# Patient Record
Sex: MALE | Race: WHITE
[De-identification: names, ages, dates, MRNs, and addresses within clinical notes are randomized per-mention and may not be internally consistent; named-entity substitution may affect disease eponyms.]

---

## 2019-03-07 ENCOUNTER — HOSPITAL ENCOUNTER (EMERGENCY)
Dept: HOSPITAL 31 - C.ER | Age: 39
Discharge: HOME | End: 2019-03-07
Payer: SELF-PAY

## 2019-03-07 VITALS — DIASTOLIC BLOOD PRESSURE: 88 MMHG | SYSTOLIC BLOOD PRESSURE: 132 MMHG

## 2019-03-07 VITALS — TEMPERATURE: 98.6 F | RESPIRATION RATE: 18 BRPM | HEART RATE: 80 BPM

## 2019-03-07 DIAGNOSIS — M25.561: Primary | ICD-10-CM

## 2019-03-07 NOTE — C.PDOC
History Of Present Illness





40 y/o male c/o one month of right knee pain s/p twisting it. pt taking 600 mg 

ibuprofen by mouth 3 times a day for pain.  no numbness or tingling. hurts to 

ambulate and go up steps. 


Time Seen by Provider: 03/07/19 13:35


Chief Complaint (Nursing): Lower Extremity Problem/Injury


History Per: Patient


History/Exam Limitations: language barrier ([id number])


Onset/Duration Of Symptoms: Days (30)


Current Symptoms Are (Timing): Still Present


Severity: Moderate





- Knee


Description Of Injury: Twisted


Alleviating Factor(s): OTC Pain Medication





Past Medical History


Reviewed: Historical Data, Nursing Documentation, Vital Signs


Vital Signs: 





                                Last Vital Signs











Temp  98.6 F   03/07/19 13:01


 


Pulse  80   03/07/19 13:01


 


Resp  18   03/07/19 13:01


 


BP  135/90   03/07/19 13:01


 


Pulse Ox  98   03/07/19 13:01














- Medical History


PMH: No Chronic Diseases


Family History: States: Unknown Family Hx





- Social History


Hx Alcohol Use: No


Hx Substance Use: No





Review Of Systems


Constitutional: Negative for: Fever, Chills


Musculoskeletal: Positive for: Other (right knee pain).  Negative for: Neck Pain


Skin: Negative for: Rash, Bruising


Neurological: Negative for: Weakness, Numbness





Physical Exam





- Physical Exam


Appears: Non-toxic, No Acute Distress


Skin: Warm, Dry


Head: Atraumatic, Normacephalic


Extremity: Normal ROM (painful right knee), Tenderness (medial aspect right 

knee), No Pedal Edema, No Calf Tenderness, Capillary Refill (less than 2 

seconds)


Neurological/Psych: Oriented x3, Normal Speech, Normal Cognition





ED Course And Treatment


O2 Sat by Pulse Oximetry: 98





Medical Decision Making


Medical Decision Making: 





pt with one month right knee pain s/p twisting it. tender medial aspect. apply 

knee brace, f/u med clinic and orthoi








Disposition


Counseled Patient/Family Regarding: Diagnosis, Need For Followup, Rx Given





- Disposition


Referrals: 


[facility] [Outside]


Disposition: HOME/ ROUTINE


Disposition Time: 14:15


Condition: GOOD


Additional Instructions: 


Follow up in clinic; will need referral to orthopedics and possible outpatient 

MRI.  Continue taking ibuprofen for pain. Cold compresses to knee several times 

a day. Wear brace during daytime only. 


Prescriptions: 


Ibuprofen [Motrin] 600 mg PO TID #30 tab


Instructions:  Knee Pain (DC)


Forms:  [facility] (English), General Discharge Instructions





- Clinical Impression


Clinical Impression: 


 Right medial knee pain

## 2019-03-09 VITALS — OXYGEN SATURATION: 98 %
